# Patient Record
(demographics unavailable — no encounter records)

---

## 2025-02-03 NOTE — HISTORY OF PRESENT ILLNESS
[FreeTextEntry1] : Rahel Pickett is a 68 y/o lady with a medical history of sHTN and HLD here for follow up for elevated liver enzymes.  Seen previously by INDER Chauhan. Initial visit with me in August, 2024.  From chat review, she was first seen for elevated liver enzymes in October, 2022. Work up for etiology including liver biopsy were largely unrevealing.  Her enzymes have since normalized since July 2024 with lifestyle changes.   At today's visit, she feels well. Denies any complaints. No jaundice, no abdominal pain, nausea. Has found a new job that she loves so much. She is happy. She has lost a lot of weight with lifestyle changes - 29lbs since her last visit with me. No longer drinking soda. Eating more fiber, less red meat. She is very pleased and hopes to lose more get to her goal weight of 130lbs. She also walks daily.  I reviewed her liver chemistries from Jan 25th which are completely normal.  Current Meds: Losartan 25mg, simvastatin 10mg   ========================================================================== Liver bx 3/14/23 showed liver with intact architecture and minimal portal inflammation, Fibrosis score 0/ 4. Minimal portal inflammation with lymphocytes and few eosinophils are present. There is no evidence of bile duct injury. CK7 and CK19 highlight bile duct profiles in all the portal tracts. Lipofuscin granules are present within the hepatocytes. There is no evidence of inflammation, cholestasis, granuloma or steatosis. No abnormal fibrosis is noted with trichrome stain. Reticulin stain highlights focal areas of hepatocyte hyperplasia. No iron or diastase resistant PAS positive globules are identified.  Fibroscan test on 10/26/22 showed F0, S0.  No family h/o liver disease. Former smoker, denies recreational drug use, rarely drinks ETOH  BW from 10/12/22 neg for hep A, B and C Abdo US 7/19/22 9mm hepatic cyst, 9mm left renal cyst. No biliary ductal dilatation.

## 2025-02-03 NOTE — PHYSICAL EXAM
[General Appearance - Alert] : alert [General Appearance - In No Acute Distress] : in no acute distress [Auscultation Breath Sounds / Voice Sounds] : lungs were clear to auscultation bilaterally [Heart Rate And Rhythm] : heart rate was normal and rhythm regular [Heart Sounds] : normal S1 and S2 [Bowel Sounds] : normal bowel sounds [Abdomen Soft] : soft [Abdomen Tenderness] : non-tender [Skin Turgor] : normal skin turgor [] : no rash

## 2025-02-03 NOTE — ASSESSMENT
[FreeTextEntry1] : Rahel Pickett 66 y/o lady with a medical history of sHTN and HLD here for follow up for elevated liver enzymes. Seen previously by INDER Chauhan. Had her initial visit with me in August, 2024.    #Elevated liver enzymes Now normalized  -Intermittent mild elevation since 2018 between 40-50s with periods of normalization. Prior laboratory work-up and liver bx on 3/14/23 for causes of chronic liver disease were all negative. Both her fibroscan test and liver biopsy showed no fibrosis and no steatosis. 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  I congratulated her on her significant achievement and advised that she continue with healthy diet and exercise.  #Hepatic cyst -9 mm hepatic and renal cyst. Explained that they are benign and no intervention needed.  #Hepatitis E IgM positive since BW 10/21/22, with PCR neg now on BW 7/18 showing IgM neg, IgG pos indicating resolution of acute hep E.  RTC as needed.

## 2025-06-17 NOTE — HISTORY OF PRESENT ILLNESS
[FreeTextEntry1] : Problems: 1. Thyroid nodules s/p right thyroidectomy  Thyroid nodules s/p right thyroidectomy  1. In 2016, patient was diagnosed with thyroid nodules and FNA of right thyroid nodule was abnormal (atypia of undetermined significance - nodule was also 8.3 cm in size) and she underwent right thyroidectomy - pathology was benign - I saw the report. Patient also has left thyroid nodules 2. Radiology: 2016 - US thyroid - right lobe measures 8.3 cm and is replaced by a single complex mass. The left lobe is normal in size and contains multiple subcentimeter nodules.  2022 - US thyroid - done in office by Dr. Veloz - subcentimeter left thyroid nodules seen 05/22/2024 - US thyroid - Right Lobe: Surgically removed. Left Lobe: 4.6 cm -  In the midpole region a stable 1.0 x 0.5 x 0.8 cm heterogeneous nodule is seen. In the lower pole a stable 0.9 x 0.7 x 0.6 cm heterogeneous nodule is seen. 06/02/2025 - US thyroid - right lobe surgically removed, left lobe measures 4.2 cm and contains a stable 1.1 x 0.5 x 0.9 cm nodule in the midpole and a lower pole stable subcentimeter nodule 3. Labs: July 2023 - TSH N May 2024 - TSH N May 2025 - TSH N 4. Mother has hypothyroidism, no family history of thyroid cancer, no personal history of radiation treatment

## 2025-06-17 NOTE — PHYSICAL EXAM
[de-identified] : General: No distress, well nourished Eyes: Normal Sclera, EOMI, PERRL ENT: Normal appearance of the nose, normal oropharynx Neck/Thyroid: No cervical lymphadenopathy,  no thyroid nodules Respiratory: No use of accessory muscles of respiration, vesicular breath sounds heard bilaterally, no crepitations or ronchi Cardiovascular: S1 and S2 heard and normal, no S3 or S4, no murmurs, radial pulse normal bilaterally Abdomen: soft, non-tender, no masses, normal bowel sounds Musculoskeletal: No swelling or deformities of joints of hands, no pedal edema Neurological: Normal range of motion in the hands, Normal brachioradialis reflexes bilaterally Psychiatry: Patient converses normally, good judgement and insight Skin: No rashes in hands, no nodules palpated in hands

## 2025-06-17 NOTE — ASSESSMENT
[FreeTextEntry1] : This patient is s/p right thyroidectomy in 2016 for thyroid nodule (pathology was benign). She has left thyroid nodules.  In May 2024, thyroid US revealed the left thyroid nodules remained stable in size - no indication for FNA - next thyroid US due in June 2025.   She is euthyroid.  Last TSH - May 2025 - N  Plan: 1. Labs to be done in July 2026 - TSH 2. Thyroid US to be done in July 2026 3. Follow up July 2026 to review the results - telehealth visit ok

## 2025-06-17 NOTE — PHYSICAL EXAM
[de-identified] : General: No distress, well nourished Eyes: Normal Sclera, EOMI, PERRL ENT: Normal appearance of the nose, normal oropharynx Neck/Thyroid: No cervical lymphadenopathy,  no thyroid nodules Respiratory: No use of accessory muscles of respiration, vesicular breath sounds heard bilaterally, no crepitations or ronchi Cardiovascular: S1 and S2 heard and normal, no S3 or S4, no murmurs, radial pulse normal bilaterally Abdomen: soft, non-tender, no masses, normal bowel sounds Musculoskeletal: No swelling or deformities of joints of hands, no pedal edema Neurological: Normal range of motion in the hands, Normal brachioradialis reflexes bilaterally Psychiatry: Patient converses normally, good judgement and insight Skin: No rashes in hands, no nodules palpated in hands

## 2025-06-17 NOTE — ASSESSMENT
Telephone Encounter by Christy Valdivia at 02/23/18 11:21 AM     Author:  Christy Valdivia Service:  (none) Author Type:       Filed:  02/23/18 11:22 AM Encounter Date:  2/23/2018 Status:  Signed     :  Christy Valdivia ()              EDWIGE GUEVARA    Patient Age: 84 year old    ACCT STATUS: COPAY  MESSAGE:[KD1.1T]   [facility] pharmacy calling to speak with the nurse concerning prior auth on Rep[employer]. Please advise[KD1.1M]    Routed to [facility] nurse pool   Next and Last Visit with Provider and Department  Next visit with [facility], IRINEO FLANAGAN is on 03/30/2018 at 10:30 AM in CARDIOLOGY HLD  Next visit with CARDIOLOGY is on 03/30/2018 at 10:30 AM in CARDIOLOGY HLD  Last visit with [facility], IRINEO FLANAGAN was on 09/28/2017 at 10:30 AM in CARDIOLOGY HLD  Last visit with CARDIOLOGY was on 09/28/2017 at 10:30 AM in CARDIOLOGY HLD     WEIGHT AND HEIGHT: As of 11/29/2017 weight is 168 lbs.(76.204 kg).   BMI is 30.90 kg/(m^2) calculated from:     Height 5' 2\" (1.575 m) as of 9/28/17     Weight 169 lb (76.658 kg) as of 9/28/17      Allergies      Allergen   Reactions   • Penicillins  Hives   • Celebrex [Celecoxib]  Rash and Swelling     Feet & ankles had a rash & swelled.  Had red blotches on thighs.    • Tape [Adhesive Tape]  Rash   • Zocor [Simvastatin]  Muscle/Joint Ache     Current outpatient prescriptions       Medication  Sig Dispense Refill   • Evolocumab 140 MG/ML SOSY Inject 1 Syringe into the skin every 14 (fourteen) days. 2 Syringe 11   • Coenzyme Q10 (COQ10) 200 MG CAPS Take 1 Cap by mouth 2 (two) times daily.     • Ticagrelor 60 MG TABS Take 1 Tab by mouth 2 (two) times daily. 180 Each 3   • lisinopril (PRINIVIL,ZESTRIL) 5 MG tablet Take 1 Tab by mouth daily. 90 Tab 3   • hydrocortisone (PROCTOSOL HC) 2.5 % rectal cream Apply  topically 2 (two) times daily. Apply To Affected Area 3 Tube 1   • levothyroxine 88 MCG tablet Take 1 Tab by mouth daily. 90 Tab 1   • Isosorbide Mononitrate CR (IMDUR) 30  [FreeTextEntry1] : This patient is s/p right thyroidectomy in 2016 for thyroid nodule (pathology was benign). She has left thyroid nodules.  In May 2024, thyroid US revealed the left thyroid nodules remained stable in size - no indication for FNA - next thyroid US due in June 2025.   She is euthyroid.  Last TSH - May 2025 - N  Plan: 1. Labs to be done in July 2026 - TSH 2. Thyroid US to be done in July 2026 3. Follow up July 2026 to review the results - telehealth visit ok    MG 24 hr tablet Take 1 Tab by mouth daily. 90 Tab 3   • Carvedilol (COREG) 12.5 MG tablet TAKE 1 TABLET TWICE DAILY WITH MEALS 180 Tab 3   • Cholecalciferol (VITAMIN D3) 1000 UNITS CAPS Take 1 Cap by mouth daily. 90 Cap 1   • aspirin 81 MG tablet Take 1 Tab by mouth daily. 90 Tab 1   • Probiotic Product (PROBIOTIC FORMULA OR) Take 1 Tab by mouth daily.        PHARMACY to use:           Pharmacy preference(s) on file:    [facility] [address] AT [facility] [address]    CALL BACK INFO:[KD1.1T] Ok to leave response (including medical information) on answering machine[KD1.1M]  ROUTING:[KD1.1T] Patient's physician/staff[KD1.1M]        PCP: Pool Wren MD         INS: Payor: [employer] MEDICARE O FFS / Plan: N/A / Product Type: *No Product type* / Note: This is the primary coverage, but no account was found for this location or the patient's primary location.   ADDRESS:  [address] [place] [id number][KD1.1T]       Revision History        User Key Date/Time User Provider Type Action    > KD1.1 02/23/18 11:22 AM Christy Valdivia  Sign    M - Manual, T - Template

## 2025-07-24 NOTE — PHYSICAL EXAM
[No Edema] : there was no peripheral edema [No Joint Swelling] : no joint swelling [Grossly Normal Strength/Tone] : grossly normal strength/tone [Coordination Grossly Intact] : coordination grossly intact [No Focal Deficits] : no focal deficits [Normal] : affect was normal and insight and judgment were intact [de-identified] : wearing glasses

## 2025-07-24 NOTE — PHYSICAL EXAM
[No Edema] : there was no peripheral edema [No Joint Swelling] : no joint swelling [Grossly Normal Strength/Tone] : grossly normal strength/tone [Coordination Grossly Intact] : coordination grossly intact [No Focal Deficits] : no focal deficits [Normal] : affect was normal and insight and judgment were intact [de-identified] : wearing glasses

## 2025-07-24 NOTE — HISTORY OF PRESENT ILLNESS
[FreeTextEntry1] : CEP, establish care  [de-identified] : 66yo female with history of HTN, osteoporosis, transaminitis, s/p right thyroidectomy, ?Hep E infection presents for CPE. She has had a great year. Got a new job and is very happy. Has been seen by specialists - no need to follow up with hepatology. Liver enzymes wnl.  She reports that she had a rare cyst in her gums - went to oral surgeon, removed it and was benign. Has some callouses/corns on feet. Seen by podiatry.   Specialists:   Endo - Dr. Menchaca  Ophtho- Dr. Gibbons GYN - Dr. Lisa Hep - Sangita Ambrosio NP  Cardio -  [facility] Maintenance  Pap smear - 2022 Mammo - 7/2024 Colon ca screening - 2018 return 5 years  DEXA - osteoporosis - declines medication  LDCT - 40 years 1/2 ppd

## 2025-07-24 NOTE — ASSESSMENT
[Vaccines Reviewed] : Immunizations reviewed today. Please see immunization details in the vaccine log within the immunization flowsheet.  [FreeTextEntry1] : Blood work requisition provided. Will follow up with results. Make appointment for repeat colonoscopy  Mammogram ordered   I am providing continuous care for this patient that includes testing, discussion of options for treatment, and shared decision making with regard to the chosen treatment.

## 2025-07-24 NOTE — HISTORY OF PRESENT ILLNESS
[FreeTextEntry1] : CEP, establish care  [de-identified] : 66yo female with history of HTN, osteoporosis, transaminitis, s/p right thyroidectomy, ?Hep E infection presents for CPE. She has had a great year. Got a new job and is very happy. Has been seen by specialists - no need to follow up with hepatology. Liver enzymes wnl.  She reports that she had a rare cyst in her gums - went to oral surgeon, removed it and was benign. Has some callouses/corns on feet. Seen by podiatry.   Specialists:   Endo - Dr. Menchaca  Ophtho- Dr. Gibbons GYN - Dr. Lisa Hep - Sangita Ambrosio NP  Cardio -  [facility] Maintenance  Pap smear - 2022 Mammo - 7/2024 Colon ca screening - 2018 return 5 years  DEXA - osteoporosis - declines medication  LDCT - 40 years 1/2 ppd

## 2025-07-24 NOTE — HEALTH RISK ASSESSMENT
[Good] : ~his/her~  mood as  good [Monthly or less (1 pt)] : Monthly or less (1 point) [1 or 2 (0 pts)] : 1 or 2 (0 points) [Never (0 pts)] : Never (0 points) [No falls in past year] : Patient reported no falls in the past year [0] : 2) Feeling down, depressed, or hopeless: Not at all (0) [PHQ-2 Negative - No further assessment needed] : PHQ-2 Negative - No further assessment needed [Former] : Former [20 or more] : 20 or more [< 15 Years] : < 15 Years [Alone] : lives alone [Fully functional (bathing, dressing, toileting, transferring, walking, feeding)] : Fully functional (bathing, dressing, toileting, transferring, walking, feeding) [Fully functional (using the telephone, shopping, preparing meals, housekeeping, doing laundry, using] : Fully functional and needs no help or supervision to perform IADLs (using the telephone, shopping, preparing meals, housekeeping, doing laundry, using transportation, managing medications and managing finances) [de-identified] : new glasses about Feb 2024  [With Patient/Caregiver] : , with patient/caregiver [Designated Healthcare Proxy] : Designated healthcare proxy [Name: ___] : Health Care Proxy's Name: [unfilled]  [Relationship: ___] : Relationship: [unfilled] [No] : In the past 12 months have you used drugs other than those required for medical reasons? No [Yes] : Reviewed medication list for presence of high-risk medications. [None] : None [Employed] : employed [Single] : single [Audit-CScore] : 1 [[id number]] : 0 [Reports changes in hearing] : Reports no changes in hearing [Reports changes in vision] : Reports no changes in vision [Reports changes in dental health] : Reports no changes in dental health [MammogramDate] : 2024 [PapSmearDate] : 2022 [BoneDensityDate] : 2021 [ColonoscopyDate] : 2018 [de-identified] : up to date  [AdvancecareDate] : 07/24/2025

## 2025-07-24 NOTE — HEALTH RISK ASSESSMENT
[Good] : ~his/her~  mood as  good [Monthly or less (1 pt)] : Monthly or less (1 point) [1 or 2 (0 pts)] : 1 or 2 (0 points) [Never (0 pts)] : Never (0 points) [No falls in past year] : Patient reported no falls in the past year [0] : 2) Feeling down, depressed, or hopeless: Not at all (0) [PHQ-2 Negative - No further assessment needed] : PHQ-2 Negative - No further assessment needed [Former] : Former [20 or more] : 20 or more [< 15 Years] : < 15 Years [Alone] : lives alone [Fully functional (bathing, dressing, toileting, transferring, walking, feeding)] : Fully functional (bathing, dressing, toileting, transferring, walking, feeding) [Fully functional (using the telephone, shopping, preparing meals, housekeeping, doing laundry, using] : Fully functional and needs no help or supervision to perform IADLs (using the telephone, shopping, preparing meals, housekeeping, doing laundry, using transportation, managing medications and managing finances) [de-identified] : new glasses about Feb 2024  [With Patient/Caregiver] : , with patient/caregiver [Designated Healthcare Proxy] : Designated healthcare proxy [Name: ___] : Health Care Proxy's Name: [unfilled]  [Relationship: ___] : Relationship: [unfilled] [No] : In the past 12 months have you used drugs other than those required for medical reasons? No [Yes] : Reviewed medication list for presence of high-risk medications. [None] : None [Employed] : employed [Single] : single [Audit-CScore] : 1 [[id number]] : 0 [Reports changes in hearing] : Reports no changes in hearing [Reports changes in vision] : Reports no changes in vision [Reports changes in dental health] : Reports no changes in dental health [MammogramDate] : 2024 [PapSmearDate] : 2022 [BoneDensityDate] : 2021 [ColonoscopyDate] : 2018 [de-identified] : up to date  [AdvancecareDate] : 07/24/2025